# Patient Record
Sex: MALE | Race: WHITE | NOT HISPANIC OR LATINO | Employment: FULL TIME | URBAN - METROPOLITAN AREA
[De-identification: names, ages, dates, MRNs, and addresses within clinical notes are randomized per-mention and may not be internally consistent; named-entity substitution may affect disease eponyms.]

---

## 2022-07-15 ENCOUNTER — OFFICE VISIT (OUTPATIENT)
Dept: URGENT CARE | Facility: CLINIC | Age: 47
End: 2022-07-15
Payer: COMMERCIAL

## 2022-07-15 VITALS
BODY MASS INDEX: 27.92 KG/M2 | HEIGHT: 70 IN | WEIGHT: 195 LBS | DIASTOLIC BLOOD PRESSURE: 86 MMHG | OXYGEN SATURATION: 97 % | HEART RATE: 84 BPM | TEMPERATURE: 97.6 F | RESPIRATION RATE: 17 BRPM | SYSTOLIC BLOOD PRESSURE: 146 MMHG

## 2022-07-15 DIAGNOSIS — R10.30 LOWER ABDOMINAL PAIN: Primary | ICD-10-CM

## 2022-07-15 PROCEDURE — 99213 OFFICE O/P EST LOW 20 MIN: CPT | Performed by: EMERGENCY MEDICINE

## 2022-07-15 NOTE — PROGRESS NOTES
[facility]        NAME: Nadiya Lafleur is a 56 y o  male  : 1975    MRN: [id number]  DATE: July 15, 2022  TIME: 5:37 PM    Assessment and Plan   Lower abdominal pain [R10 30]  1  Lower abdominal pain  Ambulatory Referral to Gastroenterology         Patient Instructions     Patient Instructions     Abdominal Pain   WHAT YOU NEED TO KNOW:   Abdominal pain can be dull, achy, or sharp  You may have pain in one area of your abdomen, or in your entire abdomen  Your pain may be caused by a condition such as constipation, food sensitivity or poisoning, infection, or a blockage  Abdominal pain can also be from a hernia, appendicitis, or an ulcer  Liver, gallbladder, or kidney conditions can also cause abdominal pain  The cause of your abdominal pain may not be known  DISCHARGE INSTRUCTIONS:   Call your local emergency number (911 in the [address] [place] [address],[address]) if:   · You have new chest pain or shortness of breath  Return to the emergency department if:   · You have pulsing pain in your upper abdomen or lower back that suddenly becomes constant  · Your pain is in the right lower abdominal area and worsens with movement  · You have a fever over 100 4°F (38°C) or shaking chills  · You are vomiting and cannot keep food or liquids down  · Your pain does not improve or gets worse over the next 8 to 12 hours  · You see blood in your vomit or bowel movements, or they look black and tarry  · Your skin or the whites of your eyes turn yellow  · You are a woman and have a large amount of vaginal bleeding that is not your monthly period  Call your doctor if:   · You have pain in your lower back  · You are a man and have pain in your testicles  · You have pain when you urinate  · You have questions or concerns about your condition or care  Medicines:   · Prescription pain medicine  may be given  Ask your healthcare provider how to take this medicine safely   Some prescription pain medicines contain acetaminophen  Do not take other medicines that contain acetaminophen without talking to your healthcare provider  Too much acetaminophen may cause liver damage  Prescription pain medicine may cause constipation  Ask your healthcare provider how to prevent or treat constipation  · Medicines  may be given to calm your stomach or prevent vomiting  · Take your medicine as directed  Contact your healthcare provider if you think your medicine is not helping or if you have side effects  Tell him of her if you are allergic to any medicine  Keep a list of the medicines, vitamins, and herbs you take  Include the amounts, and when and why you take them  Bring the list or the pill bottles to follow-up visits  Carry your medicine list with you in case of an emergency  Manage your symptoms:   1  Apply heat  on your abdomen for 20 to 30 minutes every 2 hours for as many days as directed  Heat helps decrease pain and muscle spasms  2  Make changes to the food you eat, if needed  Do not eat foods that cause abdominal pain or other symptoms  Eat small meals more often  The following changes may also help:    ? Eat more high-fiber foods if you are constipated  High-fiber foods include fruits, vegetables, whole-grain foods, and legumes  ? Do not eat foods that cause gas if you have bloating  Examples include broccoli, cabbage, and cauliflower  Do not drink soda or carbonated drinks  These may also cause gas  ? Do not eat foods or drinks that contain sorbitol or fructose if you have diarrhea and bloating  Some examples are fruit juices, candy, jelly, and sugar-free gum  ? Do not eat high-fat foods  Examples include fried foods, cheeseburgers, hot dogs, and desserts  ? Limit or do not have caffeine  Caffeine may make symptoms such as heartburn or nausea worse  ? Drink more liquids to prevent dehydration from diarrhea or vomiting    Ask your healthcare provider how much liquid to drink each day and which liquids are best for you  3  Keep a diary of your abdominal pain  A diary may help your healthcare provider learn what is causing your abdominal pain  Include when the pain happens, how long it lasts, and what the pain feels like  Write down any other symptoms you have with abdominal pain  Also write down what you eat, and what symptoms you have after you eat  4  Manage your stress  Stress may cause abdominal pain  Your healthcare provider may recommend relaxation techniques and deep breathing exercises to help decrease your stress  Your healthcare provider may recommend you talk to someone about your stress or anxiety, such as a counselor or a trusted friend  Get plenty of sleep and exercise regularly  5  Limit or do not drink alcohol  Alcohol can make your abdominal pain worse  Ask your healthcare provider if it is safe for you to drink alcohol  Also ask how much is safe for you to drink  6  Do not smoke  Nicotine and other chemicals in cigarettes can damage your esophagus and stomach  Ask your healthcare provider for information if you currently smoke and need help to quit  E-cigarettes or smokeless tobacco still contain nicotine  Talk to your healthcare provider before you use these products  Follow up with your doctor within 24 hours or as directed:  Write down your questions so you remember to ask them during your visits  © Copyright [employer] 2022 Information is for End User's use only and may not be sold, redistributed or otherwise used for commercial purposes  All illustrations and images included in CareNotes® are the copyrighted property of [employer]  or Kim Villarreal  The above information is an  only  It is not intended as medical advice for individual conditions or treatments  Talk to your doctor, nurse or pharmacist before following any medical regimen to see if it is safe and effective for you    Clear Liquid Diet   WHAT YOU NEED TO KNOW:   What is a clear liquid diet? A clear liquid diet is made up of clear liquids and foods that are liquid at room temperature  The clear liquid diet provides liquids, sugar, salt, and some nutrients until you can eat solid food  The clear liquid diet does not provide all the nutrients, vitamins, minerals, or calories that your body needs  Healthcare providers will tell you when you can start to eat regular foods  Why do I need to be on a clear liquid diet? You may need to be on a clear liquid diet before a test or surgery on your stomach or bowels (small and large intestines)  You may also need this diet if you have nausea, vomiting, diarrhea, or trouble eating solid food  Clear liquids are easily digested and do not put a strain on your stomach or intestines  They also help to keep your digestive tract empty  The digestive tract is the path that food takes through your body as it is digested  What liquids and foods can I include? · Clear juices (such as apple, cranberry, or grape), strained citrus juices or fruit punch    · Coffee or tea (without cream or milk)    · Water    · Clear sports drinks or soft drinks, such as ginger ale, lemon-lime soda, or club soda (no cola or root beer)    · Clear broth, bouillon, or consommé    · Plain popsicles (no popsicles with pureed fruit or fiber)    · Hard candy    · Flavored gelatin without fruit    What liquids and foods should I avoid? · All solid foods, such as bread, pasta, fruit, vegetables, dairy, and protein foods    · Beverages containing alcohol    · Dairy products, such as milk, hot cocoa, buttermilk, and cream    · Fruit smoothies, nectars, fruit juices with pulp, and prune juice    · Tomato and vegetable juices    · Soups that contain vegetables, noodles, rice, or meat    What is sample menu for a clear liquid diet?    · Breakfast:  1 cup of juice, ¾ cup of clear broth, ½ cup of lemon gelatin, and 1 cup of coffee with sugar    · Morning snack:  1 cup of a clear sports drink    · Lunch:  ½ cup of juice, ¾ cup of clear broth, ¾ cup of lemon-lime soda, and 1 popsicle (equals about 2 ounces of liquid)    · Afternoon snack:  1 popsicle     · Evening meal:  ½ cup of juice, ¾ cup of clear broth, ¾ cup of ginger ale, ½ cup of flavored gelatin, and 1 cup of herbal tea with honey or sugar    · Evening snack:  1 cup of flavored gelatin    What are the risks of a clear liquid diet? The clear liquid diet does not provide all the nutrients you need  You may need to drink a nutrition supplement if you have to follow this diet for more than 3 days  If you do not follow this diet, you may continue to have diarrhea, nausea, and vomiting if you were asked to follow this diet because of these problems  CARE AGREEMENT:   You have the right to help plan your care  Discuss treatment options with your healthcare provider to decide what care you want to receive  You always have the right to refuse treatment  The above information is an  only  It is not intended as medical advice for individual conditions or treatments  Talk to your doctor, nurse or pharmacist before following any medical regimen to see if it is safe and effective for you  © Copyright [employer] 2022 Information is for End User's use only and may not be sold, redistributed or otherwise used for commercial purposes  All illustrations and images included in CareNotes® are the copyrighted property of [employer]  or Kim Villarreal        Follow up with PCP in 3-5 days  Proceed to  ER if symptoms worsen  Chief Complaint     Chief Complaint   Patient presents with    Abdominal Pain     Abdominal Pain  Started about two to three weeks ago  Intermittent Pain  LBM 7/15  History of Present Illness       Patient complains of recurrent crampy bilateral abdominal pain for the past 2-3 weeks  No associated nausea, vomiting, diarrhea or constipation  Denies any black or bloody stools    He denies prior abdominal surgeries  Review of Systems   Review of Systems   Constitutional: Negative for appetite change, chills and fever  Respiratory: Negative for cough, shortness of breath and wheezing  Cardiovascular: Negative for chest pain and palpitations  Gastrointestinal: Positive for abdominal pain and nausea  Negative for abdominal distention, anal bleeding, blood in stool, constipation, diarrhea, rectal pain and vomiting  Genitourinary: Negative for flank pain  Musculoskeletal: Negative for back pain and neck stiffness  Skin: Negative for pallor  Neurological: Negative for syncope, light-headedness and headaches  Current Medications     No current outpatient medications on file  Current Allergies     Allergies as of 07/15/2022    (No Known Allergies)            The following portions of the patient's history were reviewed and updated as appropriate: allergies, current medications, past family history, past medical history, past social history, past surgical history and problem list      Past Medical History:   Diagnosis Date    Allergic        History reviewed  No pertinent surgical history  History reviewed  No pertinent family history  Medications have been verified  Objective   /86 (BP Location: Left arm, Patient Position: Sitting)   Pulse 84   Temp 97 6 °F (36 4 °C)   Resp 17   Ht 5' 10" (1 778 m)   Wt 88 5 kg (195 lb)   SpO2 97%   BMI 27 98 kg/m²        Physical Exam     Physical Exam  Vitals and nursing note reviewed  Constitutional:       General: He is not in acute distress  Appearance: He is well-developed  He is not diaphoretic  HENT:      Head: Normocephalic and atraumatic  Cardiovascular:      Rate and Rhythm: Normal rate and regular rhythm  Heart sounds: Normal heart sounds  Pulmonary:      Effort: Pulmonary effort is normal       Breath sounds: Normal breath sounds     Abdominal:      General: Bowel sounds are normal  There is no distension  Palpations: Abdomen is soft  There is no mass  Tenderness: There is no abdominal tenderness  There is no guarding or rebound  Negative signs include Butts's sign, Rovsing's sign and McBurney's sign  Hernia: No hernia is present  Musculoskeletal:         General: Normal range of motion  Skin:     General: Skin is warm and dry  Coloration: Skin is not pale  Neurological:      Mental Status: He is alert and oriented to person, place, and time  Psychiatric:         Mood and Affect: Mood normal          Behavior: Behavior normal          Thought Content:  Thought content normal          Judgment: Judgment normal

## 2022-07-15 NOTE — PATIENT INSTRUCTIONS
Abdominal Pain   WHAT YOU NEED TO KNOW:   Abdominal pain can be dull, achy, or sharp  You may have pain in one area of your abdomen, or in your entire abdomen  Your pain may be caused by a condition such as constipation, food sensitivity or poisoning, infection, or a blockage  Abdominal pain can also be from a hernia, appendicitis, or an ulcer  Liver, gallbladder, or kidney conditions can also cause abdominal pain  The cause of your abdominal pain may not be known  DISCHARGE INSTRUCTIONS:   Call your local emergency number (911 in the [facility],[address]) if:   You have new chest pain or shortness of breath  Return to the emergency department if:   You have pulsing pain in your upper abdomen or lower back that suddenly becomes constant  Your pain is in the right lower abdominal area and worsens with movement  You have a fever over 100 4°F (38°C) or shaking chills  You are vomiting and cannot keep food or liquids down  Your pain does not improve or gets worse over the next 8 to 12 hours  You see blood in your vomit or bowel movements, or they look black and tarry  Your skin or the whites of your eyes turn yellow  You are a woman and have a large amount of vaginal bleeding that is not your monthly period  Call your doctor if:   You have pain in your lower back  You are a man and have pain in your testicles  You have pain when you urinate  You have questions or concerns about your condition or care  Medicines:   Prescription pain medicine  may be given  Ask your healthcare provider how to take this medicine safely  Some prescription pain medicines contain acetaminophen  Do not take other medicines that contain acetaminophen without talking to your healthcare provider  Too much acetaminophen may cause liver damage  Prescription pain medicine may cause constipation  Ask your healthcare provider how to prevent or treat constipation       Medicines  may be given to calm your stomach or prevent vomiting  Take your medicine as directed  Contact your healthcare provider if you think your medicine is not helping or if you have side effects  Tell him of her if you are allergic to any medicine  Keep a list of the medicines, vitamins, and herbs you take  Include the amounts, and when and why you take them  Bring the list or the pill bottles to follow-up visits  Carry your medicine list with you in case of an emergency  Manage your symptoms:   Apply heat  on your abdomen for 20 to 30 minutes every 2 hours for as many days as directed  Heat helps decrease pain and muscle spasms  Make changes to the food you eat, if needed  Do not eat foods that cause abdominal pain or other symptoms  Eat small meals more often  The following changes may also help:    Eat more high-fiber foods if you are constipated  High-fiber foods include fruits, vegetables, whole-grain foods, and legumes  Do not eat foods that cause gas if you have bloating  Examples include broccoli, cabbage, and cauliflower  Do not drink soda or carbonated drinks  These may also cause gas  Do not eat foods or drinks that contain sorbitol or fructose if you have diarrhea and bloating  Some examples are fruit juices, candy, jelly, and sugar-free gum  Do not eat high-fat foods  Examples include fried foods, cheeseburgers, hot dogs, and desserts  Limit or do not have caffeine  Caffeine may make symptoms such as heartburn or nausea worse  Drink more liquids to prevent dehydration from diarrhea or vomiting  Ask your healthcare provider how much liquid to drink each day and which liquids are best for you  Keep a diary of your abdominal pain  A diary may help your healthcare provider learn what is causing your abdominal pain  Include when the pain happens, how long it lasts, and what the pain feels like  Write down any other symptoms you have with abdominal pain   Also write down what you eat, and what symptoms you have after you eat  Manage your stress  Stress may cause abdominal pain  Your healthcare provider may recommend relaxation techniques and deep breathing exercises to help decrease your stress  Your healthcare provider may recommend you talk to someone about your stress or anxiety, such as a counselor or a trusted friend  Get plenty of sleep and exercise regularly  Limit or do not drink alcohol  Alcohol can make your abdominal pain worse  Ask your healthcare provider if it is safe for you to drink alcohol  Also ask how much is safe for you to drink  Do not smoke  Nicotine and other chemicals in cigarettes can damage your esophagus and stomach  Ask your healthcare provider for information if you currently smoke and need help to quit  E-cigarettes or smokeless tobacco still contain nicotine  Talk to your healthcare provider before you use these products  Follow up with your doctor within 24 hours or as directed:  Write down your questions so you remember to ask them during your visits  © [employer] 2022 Information is for End User's use only and may not be sold, redistributed or otherwise used for commercial purposes  All illustrations and images included in CareNotes® are the copyrighted property of A [employer] Inc  or [address] [place] [address]  The above information is an  only  It is not intended as medical advice for individual conditions or treatments  Talk to your doctor, nurse or pharmacist before following any medical regimen to see if it is safe and effective for you  Clear Liquid Diet   WHAT YOU NEED TO KNOW:   What is a clear liquid diet? A clear liquid diet is made up of clear liquids and foods that are liquid at room temperature  The clear liquid diet provides liquids, sugar, salt, and some nutrients until you can eat solid food  The clear liquid diet does not provide all the nutrients, vitamins, minerals, or calories that your body needs   Healthcare providers will tell you when you can start to eat regular foods  Why do I need to be on a clear liquid diet? You may need to be on a clear liquid diet before a test or surgery on your stomach or bowels (small and large intestines)  You may also need this diet if you have nausea, vomiting, diarrhea, or trouble eating solid food  Clear liquids are easily digested and do not put a strain on your stomach or intestines  They also help to keep your digestive tract empty  The digestive tract is the path that food takes through your body as it is digested  What liquids and foods can I include? Clear juices (such as apple, cranberry, or grape), strained citrus juices or fruit punch    Coffee or tea (without cream or milk)    Water    Clear sports drinks or soft drinks, such as ginger ale, lemon-lime soda, or club soda (no cola or root beer)    Clear broth, bouillon, or consommé    Plain popsicles (no popsicles with pureed fruit or fiber)    Hard candy    Flavored gelatin without fruit    What liquids and foods should I avoid? All solid foods, such as bread, pasta, fruit, vegetables, dairy, and protein foods    Beverages containing alcohol    Dairy products, such as milk, hot cocoa, buttermilk, and cream    Fruit smoothies, nectars, fruit juices with pulp, and prune juice    Tomato and vegetable juices    Soups that contain vegetables, noodles, rice, or meat    What is sample menu for a clear liquid diet?    Breakfast:  1 cup of juice, ¾ cup of clear broth, ½ cup of lemon gelatin, and 1 cup of coffee with sugar    Morning snack:  1 cup of a clear sports drink    Lunch:  ½ cup of juice, ¾ cup of clear broth, ¾ cup of lemon-lime soda, and 1 popsicle (equals about 2 ounces of liquid)    Afternoon snack:  1 popsicle     Evening meal:  ½ cup of juice, ¾ cup of clear broth, ¾ cup of ginger ale, ½ cup of flavored gelatin, and 1 cup of herbal tea with honey or sugar    Evening snack:  1 cup of flavored gelatin    What are the risks of a clear liquid diet?  The clear liquid diet does not provide all the nutrients you need  You may need to drink a nutrition supplement if you have to follow this diet for more than 3 days  If you do not follow this diet, you may continue to have diarrhea, nausea, and vomiting if you were asked to follow this diet because of these problems  CARE AGREEMENT:   You have the right to help plan your care  Discuss treatment options with your healthcare provider to decide what care you want to receive  You always have the right to refuse treatment  The above information is an  only  It is not intended as medical advice for individual conditions or treatments  Talk to your doctor, nurse or pharmacist before following any medical regimen to see if it is safe and effective for you  © Copyright [employer] 2022 Information is for End User's use only and may not be sold, redistributed or otherwise used for commercial purposes   All illustrations and images included in CareNotes® are the copyrighted property of A D A M [employer] Inc  or [address] [place] [address]

## 2022-08-02 ENCOUNTER — TELEPHONE (OUTPATIENT)
Dept: GASTROENTEROLOGY | Facility: CLINIC | Age: 47
End: 2022-08-02

## 2023-11-11 ENCOUNTER — OCCMED (OUTPATIENT)
Dept: URGENT CARE | Facility: CLINIC | Age: 48
End: 2023-11-11
Payer: OTHER MISCELLANEOUS

## 2023-11-11 DIAGNOSIS — M54.42 ACUTE LEFT-SIDED LOW BACK PAIN WITH LEFT-SIDED SCIATICA: Primary | ICD-10-CM

## 2023-11-11 PROCEDURE — 99213 OFFICE O/P EST LOW 20 MIN: CPT

## 2023-11-11 RX ORDER — METHYLPREDNISOLONE 4 MG/1
TABLET ORAL
Qty: 21 TABLET | Refills: 0 | Status: SHIPPED | OUTPATIENT
Start: 2023-11-11 | End: 2023-11-16

## 2023-11-16 ENCOUNTER — OCCMED (OUTPATIENT)
Dept: URGENT CARE | Facility: CLINIC | Age: 48
End: 2023-11-16
Payer: OTHER MISCELLANEOUS

## 2023-11-16 DIAGNOSIS — S39.012D LOW BACK STRAIN, SUBSEQUENT ENCOUNTER: Primary | ICD-10-CM
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
## 2023-11-16 PROCEDURE — 99213 OFFICE O/P EST LOW 20 MIN: CPT | Performed by: PHYSICIAN ASSISTANT

## 2025-07-25 ENCOUNTER — OFFICE VISIT (OUTPATIENT)
Dept: URGENT CARE | Facility: CLINIC | Age: 50
End: 2025-07-25
Payer: COMMERCIAL

## 2025-07-25 VITALS
RESPIRATION RATE: 16 BRPM | HEIGHT: 70 IN | HEART RATE: 79 BPM | SYSTOLIC BLOOD PRESSURE: 146 MMHG | WEIGHT: 200 LBS | DIASTOLIC BLOOD PRESSURE: 102 MMHG | BODY MASS INDEX: 28.63 KG/M2 | TEMPERATURE: 97.6 F | OXYGEN SATURATION: 97 %

## 2025-07-25 DIAGNOSIS — S83.521A SPRAIN OF POSTERIOR CRUCIATE LIGAMENT OF RIGHT KNEE, INITIAL ENCOUNTER: Primary | ICD-10-CM

## 2025-07-25 PROCEDURE — S9083 URGENT CARE CENTER GLOBAL: HCPCS

## 2025-07-25 PROCEDURE — G0382 LEV 3 HOSP TYPE B ED VISIT: HCPCS

## 2025-07-25 NOTE — LETTER
July 25, 2025     Patient: Anibal Caballero   YOB: 1975   Date of Visit: 7/25/2025       To Whom it May Concern:    Anibal Caballero was seen in my clinic on 7/25/2025. He may return to work on 7/28/25.     Sincerely,      LARA Chaves

## 2025-07-25 NOTE — PROGRESS NOTES
[facility]  Name: Anibal Caballero      : 1975      MRN: [id number]  Encounter Provider: LARA Lindsay  Encounter Date: 2025   Encounter department: [facility] [address] [facility]  :  Assessment & Plan  Sprain of posterior cruciate ligament of right knee, initial encounter    Orders:    Ambulatory Referral to Orthopedic Surgery; Future    No acute red flag findings on physical examination or reported by patient.  X-ray imaging not indicated at this time due to lack of traumatic injury.  RICE method discussed with patient.  Ace wrap applied in clinic for increased comfort/compression.  Plan to have patient follow-up with orthopedics; referral placed today. Naproxen prescription offered however declined by patient. Tylenol/ibuprofen for pain/fever. Increased fluids & rest. May continue with other OTC and supportive therapies at home. Activity as tolerated. Encouraged to follow up closely with family physician or healthcare provider. ED precautions provided/discussed. Patient/wife in agreement with plan & verbalized understanding. Work note provided.         Patient Instructions    Use tylenol and/or ibuprofen for pain. You may also purchase 4% lidocaine patches over the counter for use at home (available at [employer], [employer], etc.). If you received any NSAID medications in the clinic (such as a toradol injection or ibuprofen) do not take any NSAID containing products (ibuprofen/advil/aleve) for the next 6 hours.     RICE! = Rest, Ice, Compression (brace, ACE wrap), elevation.    Ice for 20 minutes at a time, 3-4 times per day for 3 days.    Insulate the skin from the ice to prevent frostbite.    Gentle stretching & activity as tolerated.     Follow up with orthopedics.     Follow up with PCP in 3-5 days.  Proceed to  ER if symptoms worsen.    If tests are performed, our office will contact you with results only if changes need to made to the care plan  discussed with you at the visit. You can review your full results on [facility].    Chief Complaint:   Chief Complaint   Patient presents with    Leg Pain     Right leg pain posterior calf; was playing pickle ball and fell a pulling sensation yesterday; pain, swelling and some redness to area. Pain increases with climbing stairs; tried ice and elevation at home     History of Present Illness   Patient is a 50 year old male who presents today for evaluation of RIGHT posterior knee pain ongoing since yesterday.  He states that yesterday he was playing pickle ball when he suddenly felt a pulling sensation in the posterior right knee and calf.  He reports that at the time of the injury he had to pause as the pain was so severe.  He states that his pain gradually became worse throughout the night after the injury.  He denies experiencing any numbness or tingling to the affected extremity, although does report experiencing both swelling and erythema to the right posterior knee.  He states that he has no pain at rest however with ambulating, particularly going up and down stairs, no acute red flag findings on physical examination or reported by patient.  His pain is a 4 out of 10 pulling/tearing pain.  He states that he has been walking with a slight limp due to the discomfort.  He reports that at the time of injury he did not fall, did not strike his head, and is not experiencing any SOB or chest pain.  He has been using Advil, ice, and elevation for his symptoms and states that his symptoms have seemed to mildly improved.  Patient is noted to ambulate independently into the clinic today.          Review of Systems   Constitutional:  Negative for activity change, appetite change, chills, diaphoresis, fatigue and fever.   Respiratory:  Negative for chest tightness and shortness of breath.    Cardiovascular:  Negative for chest pain, palpitations and leg swelling.   Gastrointestinal:  Negative for abdominal pain,  "nausea and vomiting.   Musculoskeletal:  Positive for gait problem, joint swelling and myalgias. Negative for arthralgias, back pain, neck pain and neck stiffness.   Skin:  Positive for color change. Negative for pallor, rash and wound.   Neurological:  Negative for dizziness, weakness, light-headedness, numbness and headaches.     Past Medical History   Past Medical History[1]  Past Surgical History[2]  Family History[3]  he reports that he has never smoked. He has never used smokeless tobacco. He reports that he does not currently use alcohol. He reports that he does not use drugs.  No current outpatient medicationsAllergies[4]     Objective   BP (!) 146/102   Pulse 79   Temp 97.6 °F (36.4 °C)   Resp 16   Ht 5' 9.5\" (1.765 m)   Wt 90.7 kg (200 lb)   SpO2 97%   BMI 29.11 kg/m²      Physical Exam  Vitals and nursing note reviewed.   Constitutional:       General: He is not in acute distress.     Appearance: Normal appearance. He is well-developed. He is not ill-appearing, toxic-appearing or diaphoretic.   HENT:      Head: Normocephalic and atraumatic.      Right Ear: External ear normal.      Left Ear: External ear normal.      Nose: Nose normal.      Mouth/Throat:      Mouth: Mucous membranes are moist.      Pharynx: Oropharynx is clear.     Eyes:      Extraocular Movements: Extraocular movements intact.      Conjunctiva/sclera: Conjunctivae normal.      Pupils: Pupils are equal, round, and reactive to light.       Cardiovascular:      Rate and Rhythm: Normal rate and regular rhythm.      Pulses: Normal pulses.      Heart sounds: Normal heart sounds.   Pulmonary:      Effort: Pulmonary effort is normal.      Breath sounds: Normal breath sounds.   Abdominal:      General: Abdomen is flat.      Palpations: Abdomen is soft.     Musculoskeletal:         General: Swelling and tenderness present. No deformity or signs of injury.      Cervical back: Normal range of motion and neck supple.      Right upper leg: " "Normal.      Left upper leg: Normal.      Right knee: Swelling present. No deformity, effusion, erythema, ecchymosis, lacerations, bony tenderness or crepitus. Decreased range of motion. Tenderness present over the PCL. No LCL laxity, MCL laxity, ACL laxity or PCL laxity. Normal alignment, normal meniscus and normal patellar mobility. Normal pulse.      Instability Tests: Anterior drawer test negative. Posterior drawer test negative. Medial Yulisa test negative and lateral Yulisa test negative.      Left knee: Normal.      Right lower leg: Normal. No edema.      Left lower leg: Normal. No edema.        Legs:       Comments: PCL TTP with mild welling noted on exam. No bruising/erythema/wound/abrasions present. Remainder of knee is nonTTP.. Slightly decreased ROM d/t discomfort reported by patient. Sensation intact. Ambulating independently with slight limp noted.      Skin:     General: Skin is warm and dry.      Capillary Refill: Capillary refill takes less than 2 seconds.      Coloration: Skin is not pale.      Findings: No bruising, erythema, lesion or rash.     Neurological:      General: No focal deficit present.      Mental Status: He is alert and oriented to person, place, and time.      Motor: No weakness.      Gait: Gait abnormal (slight limp d/t knee pain).     Psychiatric:         Mood and Affect: Mood normal.         Behavior: Behavior normal.         Thought Content: Thought content normal.         Judgment: Judgment normal.         Portions of the record may have been created with voice recognition software.  Occasional wrong word or \"sound a like\" substitutions may have occurred due to the inherent limitations of voice recognition software.  Read the chart carefully and recognize, using context, where substitutions have occurred.           [1]   Past Medical History:  Diagnosis Date    Allergic    [2]   Past Surgical History:  Procedure Laterality Date    NO PAST SURGERIES     [3] No family history " on file.  [4] No Known Allergies

## 2025-07-25 NOTE — PATIENT INSTRUCTIONS
Use tylenol and/or ibuprofen for pain. You may also purchase 4% lidocaine patches over the counter for use at home (available at [employer], [employer], [employer], etc.). If you received any NSAID medications in the clinic (such as a toradol injection or ibuprofen) do not take any NSAID containing products (ibuprofen/advil/aleve) for the next 6 hours.     RICE! = Rest, Ice, Compression (brace, ACE wrap), elevation.    Ice for 20 minutes at a time, 3-4 times per day for 3 days.    Insulate the skin from the ice to prevent frostbite.    Gentle stretching & activity as tolerated.     Follow up with orthopedics.     Follow up with PCP in 3-5 days.  Proceed to  ER if symptoms worsen.    If tests are performed, our office will contact you with results only if changes need to made to the care plan discussed with you at the visit. You can review your full results on [facility].

## 2025-08-11 ENCOUNTER — OCCMED (OUTPATIENT)
Dept: URGENT CARE | Facility: CLINIC | Age: 50
End: 2025-08-11